# Patient Record
Sex: MALE | Race: WHITE
[De-identification: names, ages, dates, MRNs, and addresses within clinical notes are randomized per-mention and may not be internally consistent; named-entity substitution may affect disease eponyms.]

---

## 2017-07-16 ENCOUNTER — RECORDS - HEALTHEAST (OUTPATIENT)
Dept: ADMINISTRATIVE | Facility: OTHER | Age: 22
End: 2017-07-16

## 2017-10-20 ENCOUNTER — OFFICE VISIT - HEALTHEAST (OUTPATIENT)
Dept: FAMILY MEDICINE | Facility: CLINIC | Age: 22
End: 2017-10-20

## 2017-10-20 DIAGNOSIS — B35.1 ONYCHOMYCOSIS: ICD-10-CM

## 2017-10-20 ASSESSMENT — MIFFLIN-ST. JEOR: SCORE: 1860.66

## 2020-03-01 ENCOUNTER — HEALTH MAINTENANCE LETTER (OUTPATIENT)
Age: 25
End: 2020-03-01

## 2020-12-14 ENCOUNTER — HEALTH MAINTENANCE LETTER (OUTPATIENT)
Age: 25
End: 2020-12-14

## 2021-04-18 ENCOUNTER — HEALTH MAINTENANCE LETTER (OUTPATIENT)
Age: 26
End: 2021-04-18

## 2021-05-31 VITALS — BODY MASS INDEX: 27.63 KG/M2 | WEIGHT: 193 LBS | HEIGHT: 70 IN

## 2021-06-13 NOTE — PROGRESS NOTES
Years nail fungus.  Golf and hockey.   Thinks rubs hand on athletes foot.    Hx pil med.  This spring six wks once daily.  No improvement.  Just dried out my mouth.    No other issues.    Drinks more than I should.  To ease pain of looking at my nails.      hosp surg appendicitis not removed.    Fmh: dad diabetes mellitus     etoh dad.   Don't get along  Med neg.   Prn advair.    Allergy clindamycin  Photosensitivity  hab chews tobacco.  Two days a week.  Four to six drinks per session.   No issues with work or relationships or driving  Fhsh: single   .  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    Money interest is all new.  Business marketing in college.  Enjoys the work.   Stressful is maintaining perfection.  Golf.  Hockey games.   Road bike.  Stress relief.  Lifetime.  Steve.    ROS:  Constitutional: denies fever  Vision: denies change in vision.  Contact rx new  ENT: denies cough or congestion  Thyroid: denies unusual fatigue  Resp: denies shortness of breath  Card: denies palpitations  GI: denies vomiting or abnormal stool, melena, hematochezia  : denies dysuria  Neuro: denies numbness or weakness  Derm: nail onychomycosis presumed  Joints: denies redness or swelling  Endo: denies polyuria  Mental health: mood is good but if very worried about his nails and how it affects his work as he needs to maintain a professional appearance  Extremities: no edema  Mobility: stable    Otherwise negative review of systems    ROS: as noted above    OBJECTIVE:   Vitals:    10/20/17 1517   BP: 118/74   Pulse: 72   Resp: 20      Wt is noted.  No diaphoresis  Eyes: nl eom, anicteric   External ears, nose: nl    Neck: nl nodes, supple, thyroid normal   Lungs: clear to ausc   Heart: regular rhythm  Abd: soft nontender   No cva (renal) tenderness  Neuro: no weakness  Skin no rash  Joints: uninflamed   No ketotic breath odor noted  Mental: euthymic  Ext: nontender calves   Gait:  normal    Bmi:27    Multiple fingers with apparent fungal.   Extends down to the base of nail  ASSESSMENT/PLAN:    1. Onychomycosis  Ambulatory referral to Dermatology     Discussed difficulty of treatment and poor long term response.

## 2021-10-02 ENCOUNTER — HEALTH MAINTENANCE LETTER (OUTPATIENT)
Age: 26
End: 2021-10-02

## 2022-05-14 ENCOUNTER — HEALTH MAINTENANCE LETTER (OUTPATIENT)
Age: 27
End: 2022-05-14

## 2022-09-03 ENCOUNTER — HEALTH MAINTENANCE LETTER (OUTPATIENT)
Age: 27
End: 2022-09-03

## 2023-06-03 ENCOUNTER — HEALTH MAINTENANCE LETTER (OUTPATIENT)
Age: 28
End: 2023-06-03